# Patient Record
Sex: FEMALE | Employment: OTHER | URBAN - METROPOLITAN AREA
[De-identification: names, ages, dates, MRNs, and addresses within clinical notes are randomized per-mention and may not be internally consistent; named-entity substitution may affect disease eponyms.]

---

## 2021-04-28 ENCOUNTER — OFFICE VISIT (OUTPATIENT)
Dept: PODIATRY | Facility: CLINIC | Age: 81
End: 2021-04-28
Payer: COMMERCIAL

## 2021-04-28 VITALS
BODY MASS INDEX: 18.9 KG/M2 | HEIGHT: 71 IN | SYSTOLIC BLOOD PRESSURE: 90 MMHG | DIASTOLIC BLOOD PRESSURE: 70 MMHG | WEIGHT: 135 LBS

## 2021-04-28 DIAGNOSIS — L60.3 NAIL DYSTROPHY: Primary | ICD-10-CM

## 2021-04-28 PROCEDURE — 99202 OFFICE O/P NEW SF 15 MIN: CPT | Performed by: PODIATRIST

## 2021-04-28 RX ORDER — MIRABEGRON 50 MG/1
TABLET, FILM COATED, EXTENDED RELEASE ORAL
COMMUNITY
Start: 2021-03-10

## 2021-04-28 RX ORDER — LEVOTHYROXINE SODIUM 0.05 MG/1
50 TABLET ORAL
COMMUNITY

## 2021-04-28 RX ORDER — AMANTADINE HYDROCHLORIDE 100 MG/1
CAPSULE, GELATIN COATED ORAL
COMMUNITY
Start: 2021-02-22

## 2021-04-28 RX ORDER — TRAZODONE HYDROCHLORIDE 50 MG/1
50 TABLET ORAL
COMMUNITY
Start: 2020-11-27 | End: 2021-11-27

## 2021-04-28 RX ORDER — CLONAZEPAM 0.5 MG/1
0.5 TABLET ORAL
COMMUNITY
Start: 2021-02-10

## 2021-04-28 RX ORDER — DIPHENOXYLATE HYDROCHLORIDE AND ATROPINE SULFATE 2.5; .025 MG/1; MG/1
1 TABLET ORAL DAILY
COMMUNITY

## 2021-04-28 RX ORDER — ALBUTEROL SULFATE 90 UG/1
2 AEROSOL, METERED RESPIRATORY (INHALATION) EVERY 6 HOURS PRN
COMMUNITY
Start: 2020-10-09 | End: 2021-10-09

## 2021-04-28 RX ORDER — TIOTROPIUM BROMIDE INHALATION SPRAY 3.12 UG/1
SPRAY, METERED RESPIRATORY (INHALATION)
COMMUNITY
Start: 2021-03-11

## 2021-04-28 RX ORDER — AMOXICILLIN 250 MG
1 CAPSULE ORAL DAILY
COMMUNITY
Start: 2021-02-22 | End: 2022-02-22

## 2021-04-28 RX ORDER — MIDODRINE HYDROCHLORIDE 10 MG/1
TABLET ORAL
COMMUNITY
Start: 2021-04-12

## 2021-04-28 NOTE — PROGRESS NOTES
Assessment/Plan:    Treatment consisted of nail trimming  Patient is rescheduled in 10 weeks for palliative nail care  No problem-specific Assessment & Plan notes found for this encounter  Diagnoses and all orders for this visit:    Nail dystrophy    Other orders  -     albuterol (PROVENTIL HFA,VENTOLIN HFA) 90 mcg/act inhaler; Inhale 2 puffs every 6 (six) hours as needed  -     levothyroxine 50 mcg tablet; Take 50 mcg by mouth  -     doxylamine (UNISON) 25 MG tablet; Take 25 mg by mouth daily as needed  -     multivitamin (THERAGRAN) TABS; Take 1 tablet by mouth daily  -     amantadine (SYMMETREL) 100 mg capsule  -     Aspirin Buf,CaCarb-MgCarb-MgO, 81 MG TABS; Take by mouth  -     carbidopa-levodopa (SINEMET)  mg per tablet; Take 2 tablets by mouth every 2 (two) hours  -     clonazePAM (KlonoPIN) 0 5 mg tablet; Take 0 5 mg by mouth  -     Myrbetriq 50 MG TB24  -     traZODone (DESYREL) 50 mg tablet; Take 50 mg by mouth  -     midodrine (PROAMATINE) 10 MG tablet  -     Spiriva Respimat 2 5 MCG/ACT AERS inhaler  -     senna-docusate sodium (Senokot S) 8 6-50 mg per tablet; Take 1 tablet by mouth daily          Subjective:      Patient ID: Umang Ramires is a 68 y o  male    HPI     Patient, a 37-year-old male with Parkinson's presents with his wife  Chief complaint involves long toenails that he is unable to cut  Patient was referred by the [place]  The following portions of the patient's history were reviewed and updated as appropriate: allergies, current medications, past family history, past medical history, past social history, past surgical history and problem list     Review of Systems   Gastrointestinal: Negative  Musculoskeletal: Negative  Neurological: Positive for tremors and weakness  Psychiatric/Behavioral: Negative                    Objective:      BP 90/70   Ht 5' 11" (1 803 m) Comment: verbal  Wt 61 2 kg (135 lb)   BMI 18 83 kg/m²          Physical Exam  Constitutional: Appearance: Normal appearance  Cardiovascular:      Pulses: Normal pulses  Musculoskeletal: Normal range of motion  Skin:     Comments: All toenails are elongated;  left 3rd toenail is thick and dystrophic   Neurological:      General: No focal deficit present  Mental Status: She is oriented to person, place, and time

## 2021-06-09 ENCOUNTER — TELEPHONE (OUTPATIENT)
Dept: PODIATRY | Facility: CLINIC | Age: 81
End: 2021-06-09

## 2021-07-14 ENCOUNTER — OFFICE VISIT (OUTPATIENT)
Dept: PODIATRY | Facility: CLINIC | Age: 81
End: 2021-07-14
Payer: COMMERCIAL

## 2021-07-14 VITALS — DIASTOLIC BLOOD PRESSURE: 70 MMHG | BODY MASS INDEX: 18.83 KG/M2 | SYSTOLIC BLOOD PRESSURE: 126 MMHG | HEIGHT: 71 IN

## 2021-07-14 DIAGNOSIS — L60.3 NAIL DYSTROPHY: Primary | ICD-10-CM

## 2021-07-14 PROCEDURE — 99212 OFFICE O/P EST SF 10 MIN: CPT | Performed by: PODIATRIST

## 2021-07-14 RX ORDER — SERTRALINE HYDROCHLORIDE 25 MG/1
TABLET, FILM COATED ORAL
COMMUNITY
Start: 2021-07-13

## 2021-07-14 RX ORDER — ENTACAPONE 200 MG/1
TABLET ORAL
COMMUNITY
Start: 2021-04-01

## 2021-07-14 RX ORDER — ROSUVASTATIN CALCIUM 40 MG/1
TABLET, COATED ORAL
COMMUNITY
Start: 2021-04-01

## 2021-07-14 NOTE — PROGRESS NOTES
Patient presents with his wife  Patient having difficulty communicating as he did not wear his hearing aid  Chief complaint involves long toenails  These toenails are thickened yellow secondary to onychomycosis  There are palpable pedal pulses  Main problem for patient is Parkinson's  Treatment consisted of nail trimming  Reappoint in 10 weeks